# Patient Record
Sex: MALE | Race: WHITE | NOT HISPANIC OR LATINO | Employment: FULL TIME | URBAN - METROPOLITAN AREA
[De-identification: names, ages, dates, MRNs, and addresses within clinical notes are randomized per-mention and may not be internally consistent; named-entity substitution may affect disease eponyms.]

---

## 2023-09-06 PROBLEM — R00.2 PALPITATIONS: Status: ACTIVE | Noted: 2023-09-06

## 2023-09-06 PROBLEM — I25.10 CORONARY ARTERY DISEASE INVOLVING NATIVE CORONARY ARTERY OF NATIVE HEART WITHOUT ANGINA PECTORIS: Status: ACTIVE | Noted: 2023-09-06

## 2023-09-06 PROBLEM — K43.9 VENTRAL HERNIA: Status: ACTIVE | Noted: 2021-02-06

## 2023-09-06 PROBLEM — I25.5 ISCHEMIC CARDIOMYOPATHY: Status: ACTIVE | Noted: 2023-09-06

## 2023-09-06 PROBLEM — I10 ESSENTIAL (PRIMARY) HYPERTENSION: Status: ACTIVE | Noted: 2022-06-20

## 2023-09-06 PROBLEM — H66.92 LEFT OTITIS MEDIA: Status: ACTIVE | Noted: 2022-04-21

## 2023-09-06 PROBLEM — E78.00 HYPERCHOLESTEREMIA: Status: ACTIVE | Noted: 2019-01-09

## 2023-09-06 PROBLEM — I73.00 RAYNAUD'S PHENOMENON WITHOUT GANGRENE: Status: ACTIVE | Noted: 2023-09-06

## 2023-09-06 PROBLEM — M75.41 IMPINGEMENT SYNDROME OF RIGHT SHOULDER: Status: ACTIVE | Noted: 2020-01-24

## 2023-09-06 PROBLEM — E78.00 PURE HYPERCHOLESTEROLEMIA: Status: ACTIVE | Noted: 2020-01-24

## 2023-09-06 PROBLEM — L29.9 PRURITUS: Status: ACTIVE | Noted: 2019-08-05

## 2023-09-06 PROBLEM — I71.9 AORTIC ANEURYSM (CMS-HCC): Status: ACTIVE | Noted: 2022-06-20

## 2023-09-06 PROBLEM — Z95.5 HISTORY OF CORONARY ARTERY STENT PLACEMENT: Status: ACTIVE | Noted: 2023-09-06

## 2023-09-06 PROBLEM — I20.9 ANGINA PECTORIS (CMS-HCC): Status: ACTIVE | Noted: 2023-09-06

## 2023-09-06 PROBLEM — M77.12 LEFT LATERAL EPICONDYLITIS: Status: ACTIVE | Noted: 2020-01-24

## 2023-09-06 RX ORDER — NAPROXEN SODIUM 220 MG/1
81 TABLET, FILM COATED ORAL EVERY OTHER DAY
COMMUNITY

## 2023-09-06 RX ORDER — ACETAMINOPHEN 500 MG
1000 TABLET ORAL EVERY 4 HOURS PRN
COMMUNITY

## 2023-09-06 RX ORDER — LISINOPRIL 5 MG/1
5 TABLET ORAL DAILY
COMMUNITY
End: 2024-01-12 | Stop reason: ALTCHOICE

## 2023-09-06 RX ORDER — ASPIRIN 81 MG/1
81 TABLET ORAL DAILY
COMMUNITY
End: 2024-01-12 | Stop reason: SDUPTHER

## 2023-09-06 RX ORDER — ATORVASTATIN CALCIUM 80 MG/1
80 TABLET, FILM COATED ORAL DAILY
COMMUNITY
End: 2024-01-12 | Stop reason: SDUPTHER

## 2023-09-06 RX ORDER — AMLODIPINE BESYLATE 2.5 MG/1
2.5 TABLET ORAL DAILY
COMMUNITY
End: 2024-01-12 | Stop reason: DRUGHIGH

## 2023-09-06 RX ORDER — ATORVASTATIN CALCIUM 40 MG/1
40 TABLET, FILM COATED ORAL DAILY
COMMUNITY

## 2023-10-30 DIAGNOSIS — I10 PRIMARY HYPERTENSION: Primary | ICD-10-CM

## 2023-10-31 RX ORDER — AMLODIPINE BESYLATE 2.5 MG/1
2.5 TABLET ORAL DAILY
Qty: 30 TABLET | Refills: 11 | Status: SHIPPED | OUTPATIENT
Start: 2023-10-31 | End: 2024-01-12 | Stop reason: WASHOUT

## 2024-01-12 ENCOUNTER — OFFICE VISIT (OUTPATIENT)
Dept: CARDIOLOGY | Facility: CLINIC | Age: 59
End: 2024-01-12
Payer: COMMERCIAL

## 2024-01-12 VITALS
WEIGHT: 184 LBS | SYSTOLIC BLOOD PRESSURE: 160 MMHG | DIASTOLIC BLOOD PRESSURE: 80 MMHG | BODY MASS INDEX: 27.98 KG/M2 | HEART RATE: 67 BPM | OXYGEN SATURATION: 96 %

## 2024-01-12 DIAGNOSIS — I10 ESSENTIAL HYPERTENSION: Primary | ICD-10-CM

## 2024-01-12 PROCEDURE — 3079F DIAST BP 80-89 MM HG: CPT | Performed by: INTERNAL MEDICINE

## 2024-01-12 PROCEDURE — 3077F SYST BP >= 140 MM HG: CPT | Performed by: INTERNAL MEDICINE

## 2024-01-12 PROCEDURE — 99214 OFFICE O/P EST MOD 30 MIN: CPT | Performed by: INTERNAL MEDICINE

## 2024-01-12 PROCEDURE — 1036F TOBACCO NON-USER: CPT | Performed by: INTERNAL MEDICINE

## 2024-01-12 RX ORDER — AMLODIPINE BESYLATE 5 MG/1
5 TABLET ORAL DAILY
Qty: 30 TABLET | Refills: 11 | Status: SHIPPED | OUTPATIENT
Start: 2024-01-12

## 2024-01-12 ASSESSMENT — PAIN SCALES - GENERAL: PAINLEVEL: 0-NO PAIN

## 2024-01-12 NOTE — PROGRESS NOTES
Subjective      No chief complaint on file.       Here to reassess his known coronary artery disease, annual visit, compliant with his high-dose statin and low-dose aspirin therapy.  He has noticed that his blood pressures at home have climbed into the 140s systolic because he has been not eating well with the holidays and he stopped doing his regular exercise routine but he is now committed to healthier eating and daily exercise.    Previous: History of anterior wall myocardial infarction and ventricular fibrillation arrest August 3, 2018 in [facility] with 2 bare-metal stents placed in his proximal and mid LAD at that time.  Initial left ventricular ejection fraction 35 to 40%, and after guideline directed therapy repeat echocardiogram August 2018 left ventricular ejection fraction 50 to 54%.    July 2022 he had successful endovascular graft repair of an abdominal aortic aneurysm by his previous vascular surgeon, Dr. Virgil Valladares.         Review of Systems   All other systems reviewed and are negative.       Objective   Physical Exam  Constitutional:       Appearance: Normal appearance.   HENT:      Head: Normocephalic and atraumatic.   Eyes:      Pupils: Pupils are equal, round, and reactive to light.   Cardiovascular:      Rate and Rhythm: Normal rate and regular rhythm.      Pulses: Normal pulses.      Heart sounds: Normal heart sounds.   Pulmonary:      Effort: Pulmonary effort is normal.      Breath sounds: Normal breath sounds.   Abdominal:      General: Abdomen is flat. Bowel sounds are normal.      Palpations: Abdomen is soft.   Musculoskeletal:         General: Normal range of motion.      Cervical back: Normal range of motion.   Skin:     General: Skin is warm and dry.   Neurological:      General: No focal deficit present.   Psychiatric:         Mood and Affect: Mood normal.         Judgment: Judgment normal.          Lab Review:   Not applicable    Ischemic cardiomyopathy  Continue his chronic aspirin and  atorvastatin therapy, and I reviewed his lipid profile from June 2023 followed by his primary care physician.  Cholesterol 140, triglycerides 60, LDL was 76 and HDL is 72 which is excellent lipid risk reduction.  His metabolic profile showed stable hepatic and renal function.    Increase his amlodipine to 5 mg daily for better blood pressure control until he is successful at committing to resuming his exercise.    Commitment to daily walking/exercise and a low carbohydrate and low sugar [place]/Mediterranean dietary lifestyle.

## 2024-01-12 NOTE — PATIENT INSTRUCTIONS
Ischemic cardiomyopathy  Continue his chronic aspirin and atorvastatin therapy, and I reviewed his lipid profile from June 2023 followed by his primary care physician.  Cholesterol 140, triglycerides 60, LDL was 76 and HDL is 72 which is excellent lipid risk reduction.  His metabolic profile showed stable hepatic and renal function.    Increase his amlodipine to 5 mg daily for better blood pressure control until he is successful at committing to resuming his exercise.    Commitment to daily walking/exercise and a low carbohydrate and low sugar [place]/Mediterranean dietary lifestyle.

## 2024-01-16 RX ORDER — AMLODIPINE BESYLATE 5 MG/1
5 TABLET ORAL DAILY
Qty: 30 TABLET | Refills: 11 | Status: CANCELLED | OUTPATIENT
Start: 2024-01-16

## 2024-01-23 DIAGNOSIS — E78.5 HYPERLIPIDEMIA: Primary | ICD-10-CM

## 2024-01-23 RX ORDER — ATORVASTATIN CALCIUM 40 MG/1
40 TABLET, FILM COATED ORAL DAILY
Qty: 90 TABLET | Refills: 0 | Status: SHIPPED | OUTPATIENT
Start: 2024-01-23 | End: 2024-04-22

## 2024-05-31 ENCOUNTER — OFFICE VISIT (OUTPATIENT)
Dept: CARDIOLOGY | Facility: CLINIC | Age: 59
End: 2024-05-31
Payer: COMMERCIAL

## 2024-05-31 VITALS
SYSTOLIC BLOOD PRESSURE: 146 MMHG | OXYGEN SATURATION: 98 % | HEART RATE: 69 BPM | DIASTOLIC BLOOD PRESSURE: 80 MMHG | WEIGHT: 180 LBS | BODY MASS INDEX: 27.37 KG/M2

## 2024-05-31 DIAGNOSIS — I42.9 CARDIOMYOPATHY (MULTI): Primary | ICD-10-CM

## 2024-05-31 PROCEDURE — 99214 OFFICE O/P EST MOD 30 MIN: CPT | Performed by: INTERNAL MEDICINE

## 2024-05-31 PROCEDURE — 3079F DIAST BP 80-89 MM HG: CPT | Performed by: INTERNAL MEDICINE

## 2024-05-31 PROCEDURE — 3077F SYST BP >= 140 MM HG: CPT | Performed by: INTERNAL MEDICINE

## 2024-05-31 PROCEDURE — 1036F TOBACCO NON-USER: CPT | Performed by: INTERNAL MEDICINE

## 2024-05-31 NOTE — ASSESSMENT & PLAN NOTE
Excellent management on his high-dose atorvastatin that is followed by his primary care physician.

## 2024-05-31 NOTE — PROGRESS NOTES
Subjective      Chief Complaint   Patient presents with    Follow-up        Here to reassess his coronary artery disease and hypertension management, and he remains angina free with normal functional capacity.  He remains on high-dose statin therapy and low-dose aspirin and lipid profiles by his primary care physician.    Previous: History of anterior wall myocardial infarction and ventricular fibrillation arrest August 3, 2018 in [facility] with 2 bare-metal stents placed in his proximal and mid LAD at that time.  Initial left ventricular ejection fraction 35 to 40%, and after guideline directed therapy repeat echocardiogram August 2018 left ventricular ejection fraction 50 to 54%.     July 2022 he had successful endovascular graft repair of an abdominal aortic aneurysm by his previous vascular surgeon, Dr. Virgil Valladares.              Review of Systems   All other systems reviewed and are negative.       Objective   Physical Exam  Constitutional:       Appearance: Normal appearance.   HENT:      Head: Normocephalic and atraumatic.   Eyes:      Pupils: Pupils are equal, round, and reactive to light.   Cardiovascular:      Rate and Rhythm: Normal rate and regular rhythm.      Pulses: Normal pulses.      Heart sounds: Normal heart sounds.   Pulmonary:      Effort: Pulmonary effort is normal.      Breath sounds: Normal breath sounds.   Abdominal:      General: Abdomen is flat. Bowel sounds are normal.      Palpations: Abdomen is soft.   Musculoskeletal:         General: Normal range of motion.      Cervical back: Normal range of motion.   Skin:     General: Skin is warm and dry.   Neurological:      General: No focal deficit present.   Psychiatric:         Mood and Affect: Mood normal.         Judgment: Judgment normal.          Lab Review:   Not applicable    Ischemic cardiomyopathy  History of previous myocardial infarction with left ventricular ejection fraction 50 to 54% on prior echocardiography.    Commitment to daily  walking/exercise and a low carbohydrate and low sugar Mediterranean/[employer] diet discussed.    History of coronary artery stent placement  Continue on his high-dose statin therapy and low-dose aspirin therapy.    Hypercholesteremia  Excellent management on his high-dose atorvastatin that is followed by his primary care physician.

## 2024-05-31 NOTE — ASSESSMENT & PLAN NOTE
History of previous myocardial infarction with left ventricular ejection fraction 50 to 54% on prior echocardiography.  Suggest to repeat echocardiogram in 1 year just prior to his follow-up visit so we can reassess LV systolic function.    Commitment to daily walking/exercise and a low carbohydrate and low sugar Mediterranean/[place] diet discussed.

## 2024-06-06 ENCOUNTER — TELEPHONE (OUTPATIENT)
Dept: CARDIOLOGY | Facility: CLINIC | Age: 59
End: 2024-06-06
Payer: COMMERCIAL

## 2024-06-06 NOTE — TELEPHONE ENCOUNTER
Patient asking if Dr. Cartwright actually wants an echo as he did not discuss at last appointment. Please clarify. Order is in

## 2024-06-19 ENCOUNTER — HOSPITAL ENCOUNTER (OUTPATIENT)
Dept: CARDIOLOGY | Facility: CLINIC | Age: 59
Discharge: HOME | End: 2024-06-19
Payer: COMMERCIAL

## 2024-06-19 ENCOUNTER — TELEPHONE (OUTPATIENT)
Dept: PRIMARY CARE | Facility: CLINIC | Age: 59
End: 2024-06-19
Payer: COMMERCIAL

## 2024-06-19 DIAGNOSIS — Z12.5 SCREENING FOR PROSTATE CANCER: ICD-10-CM

## 2024-06-19 DIAGNOSIS — E78.00 PURE HYPERCHOLESTEROLEMIA: ICD-10-CM

## 2024-06-19 DIAGNOSIS — I42.9 CARDIOMYOPATHY (MULTI): ICD-10-CM

## 2024-06-19 DIAGNOSIS — I10 ESSENTIAL (PRIMARY) HYPERTENSION: ICD-10-CM

## 2024-06-19 DIAGNOSIS — Z00.00 ANNUAL PHYSICAL EXAM: Primary | ICD-10-CM

## 2024-06-19 PROCEDURE — 93306 TTE W/DOPPLER COMPLETE: CPT

## 2024-06-19 PROCEDURE — 93306 TTE W/DOPPLER COMPLETE: CPT | Performed by: INTERNAL MEDICINE

## 2024-06-21 ENCOUNTER — LAB (OUTPATIENT)
Dept: LAB | Facility: LAB | Age: 59
End: 2024-06-21
Payer: COMMERCIAL

## 2024-06-21 DIAGNOSIS — I10 ESSENTIAL (PRIMARY) HYPERTENSION: ICD-10-CM

## 2024-06-21 DIAGNOSIS — Z12.5 SCREENING FOR PROSTATE CANCER: ICD-10-CM

## 2024-06-21 DIAGNOSIS — E78.00 PURE HYPERCHOLESTEROLEMIA: ICD-10-CM

## 2024-06-21 DIAGNOSIS — Z00.00 ANNUAL PHYSICAL EXAM: ICD-10-CM

## 2024-06-21 LAB
ALBUMIN SERPL-MCNC: 4.3 G/DL (ref 3.5–5)
ALP BLD-CCNC: 95 U/L (ref 35–125)
ALT SERPL-CCNC: 17 U/L (ref 5–40)
ANION GAP SERPL CALC-SCNC: 10 MMOL/L
AORTIC VALVE PEAK VELOCITY: 1.6 M/S
APPEARANCE UR: CLEAR
AST SERPL-CCNC: 20 U/L (ref 5–40)
AV PEAK GRADIENT: 10.2 MMHG
AVA (PEAK VEL): 2.55 CM2
BASOPHILS # BLD AUTO: 0.06 X10*3/UL (ref 0–0.1)
BASOPHILS NFR BLD AUTO: 1.2 %
BILIRUB SERPL-MCNC: 0.4 MG/DL (ref 0.1–1.2)
BILIRUB UR STRIP.AUTO-MCNC: NEGATIVE MG/DL
BUN SERPL-MCNC: 15 MG/DL (ref 8–25)
CALCIUM SERPL-MCNC: 9 MG/DL (ref 8.5–10.4)
CHLORIDE SERPL-SCNC: 104 MMOL/L (ref 97–107)
CHOLEST SERPL-MCNC: 144 MG/DL (ref 133–200)
CHOLEST/HDLC SERPL: 2.7 {RATIO}
CO2 SERPL-SCNC: 27 MMOL/L (ref 24–31)
COLOR UR: COLORLESS
CREAT SERPL-MCNC: 1 MG/DL (ref 0.4–1.6)
CREAT UR-MCNC: 56.4 MG/DL
EGFRCR SERPLBLD CKD-EPI 2021: 86 ML/MIN/1.73M*2
EJECTION FRACTION APICAL 4 CHAMBER: 54.5
EJECTION FRACTION: 53 %
EOSINOPHIL # BLD AUTO: 0.1 X10*3/UL (ref 0–0.7)
EOSINOPHIL NFR BLD AUTO: 2 %
ERYTHROCYTE [DISTWIDTH] IN BLOOD BY AUTOMATED COUNT: 13 % (ref 11.5–14.5)
GLUCOSE SERPL-MCNC: 98 MG/DL (ref 65–99)
GLUCOSE UR STRIP.AUTO-MCNC: NORMAL MG/DL
HCT VFR BLD AUTO: 49.4 % (ref 41–52)
HDLC SERPL-MCNC: 53 MG/DL
HGB BLD-MCNC: 16.3 G/DL (ref 13.5–17.5)
IMM GRANULOCYTES # BLD AUTO: 0.02 X10*3/UL (ref 0–0.7)
IMM GRANULOCYTES NFR BLD AUTO: 0.4 % (ref 0–0.9)
KETONES UR STRIP.AUTO-MCNC: NEGATIVE MG/DL
LDLC SERPL CALC-MCNC: 76 MG/DL (ref 65–130)
LEFT ATRIUM VOLUME AREA LENGTH INDEX BSA: 33.3 ML/M2
LEFT VENTRICLE INTERNAL DIMENSION DIASTOLE: 4.7 CM (ref 3.5–6)
LEFT VENTRICULAR OUTFLOW TRACT DIAMETER: 2.1 CM
LEUKOCYTE ESTERASE UR QL STRIP.AUTO: NEGATIVE
LYMPHOCYTES # BLD AUTO: 1.4 X10*3/UL (ref 1.2–4.8)
LYMPHOCYTES NFR BLD AUTO: 27.9 %
MCH RBC QN AUTO: 31.3 PG (ref 26–34)
MCHC RBC AUTO-ENTMCNC: 33 G/DL (ref 32–36)
MCV RBC AUTO: 95 FL (ref 80–100)
MICROALBUMIN UR-MCNC: <12 MG/L (ref 0–23)
MICROALBUMIN/CREAT UR: NORMAL MG/G{CREAT}
MITRAL VALVE E/A RATIO: 0.81
MITRAL VALVE E/E' RATIO: 9.9
MONOCYTES # BLD AUTO: 0.52 X10*3/UL (ref 0.1–1)
MONOCYTES NFR BLD AUTO: 10.4 %
NEUTROPHILS # BLD AUTO: 2.92 X10*3/UL (ref 1.2–7.7)
NEUTROPHILS NFR BLD AUTO: 58.1 %
NITRITE UR QL STRIP.AUTO: NEGATIVE
NRBC BLD-RTO: 0 /100 WBCS (ref 0–0)
PH UR STRIP.AUTO: 6.5 [PH]
PLATELET # BLD AUTO: 227 X10*3/UL (ref 150–450)
POTASSIUM SERPL-SCNC: 4.6 MMOL/L (ref 3.4–5.1)
PROT SERPL-MCNC: 7.2 G/DL (ref 5.9–7.9)
PROT UR STRIP.AUTO-MCNC: NEGATIVE MG/DL
PSA SERPL-MCNC: 1.8 NG/ML
RBC # BLD AUTO: 5.21 X10*6/UL (ref 4.5–5.9)
RBC # UR STRIP.AUTO: NEGATIVE /UL
RIGHT VENTRICLE FREE WALL PEAK S': 11.3 CM/S
RIGHT VENTRICLE PEAK SYSTOLIC PRESSURE: 25.3 MMHG
SODIUM SERPL-SCNC: 141 MMOL/L (ref 133–145)
SP GR UR STRIP.AUTO: 1.01
TRICUSPID ANNULAR PLANE SYSTOLIC EXCURSION: 2.6 CM
TRIGL SERPL-MCNC: 74 MG/DL (ref 40–150)
UROBILINOGEN UR STRIP.AUTO-MCNC: NORMAL MG/DL
WBC # BLD AUTO: 5 X10*3/UL (ref 4.4–11.3)

## 2024-06-21 PROCEDURE — 80053 COMPREHEN METABOLIC PANEL: CPT

## 2024-06-21 PROCEDURE — 80061 LIPID PANEL: CPT

## 2024-06-21 PROCEDURE — 81003 URINALYSIS AUTO W/O SCOPE: CPT

## 2024-06-21 PROCEDURE — 84153 ASSAY OF PSA TOTAL: CPT

## 2024-06-21 PROCEDURE — 36415 COLL VENOUS BLD VENIPUNCTURE: CPT

## 2024-06-21 PROCEDURE — 82570 ASSAY OF URINE CREATININE: CPT

## 2024-06-21 PROCEDURE — 85025 COMPLETE CBC W/AUTO DIFF WBC: CPT

## 2024-06-21 PROCEDURE — 82043 UR ALBUMIN QUANTITATIVE: CPT

## 2024-06-25 ASSESSMENT — PROMIS GLOBAL HEALTH SCALE
RATE_PHYSICAL_HEALTH: EXCELLENT
CARRYOUT_PHYSICAL_ACTIVITIES: COMPLETELY
CARRYOUT_SOCIAL_ACTIVITIES: EXCELLENT
RATE_SOCIAL_SATISFACTION: EXCELLENT
RATE_MENTAL_HEALTH: EXCELLENT
RATE_GENERAL_HEALTH: EXCELLENT
RATE_AVERAGE_PAIN: 2
RATE_QUALITY_OF_LIFE: EXCELLENT
EMOTIONAL_PROBLEMS: NEVER

## 2024-06-27 ENCOUNTER — APPOINTMENT (OUTPATIENT)
Dept: PRIMARY CARE | Facility: CLINIC | Age: 59
End: 2024-06-27
Payer: COMMERCIAL

## 2024-06-27 ENCOUNTER — TELEPHONE (OUTPATIENT)
Dept: PRIMARY CARE | Facility: CLINIC | Age: 59
End: 2024-06-27

## 2024-06-27 VITALS
HEIGHT: 67 IN | RESPIRATION RATE: 18 BRPM | BODY MASS INDEX: 27.94 KG/M2 | OXYGEN SATURATION: 98 % | SYSTOLIC BLOOD PRESSURE: 118 MMHG | DIASTOLIC BLOOD PRESSURE: 84 MMHG | HEART RATE: 62 BPM | WEIGHT: 178 LBS

## 2024-06-27 DIAGNOSIS — I25.10 CORONARY ARTERY DISEASE INVOLVING NATIVE CORONARY ARTERY OF NATIVE HEART WITHOUT ANGINA PECTORIS: ICD-10-CM

## 2024-06-27 DIAGNOSIS — Z72.0 TOBACCO ABUSE: ICD-10-CM

## 2024-06-27 DIAGNOSIS — E78.00 PURE HYPERCHOLESTEROLEMIA: ICD-10-CM

## 2024-06-27 DIAGNOSIS — I10 ESSENTIAL (PRIMARY) HYPERTENSION: ICD-10-CM

## 2024-06-27 DIAGNOSIS — E78.00 PURE HYPERCHOLESTEROLEMIA: Primary | ICD-10-CM

## 2024-06-27 DIAGNOSIS — Z00.00 WELL ADULT EXAM: ICD-10-CM

## 2024-06-27 PROCEDURE — 3074F SYST BP LT 130 MM HG: CPT | Performed by: FAMILY MEDICINE

## 2024-06-27 PROCEDURE — 1036F TOBACCO NON-USER: CPT | Performed by: FAMILY MEDICINE

## 2024-06-27 PROCEDURE — 99396 PREV VISIT EST AGE 40-64: CPT | Performed by: FAMILY MEDICINE

## 2024-06-27 PROCEDURE — 99212 OFFICE O/P EST SF 10 MIN: CPT | Performed by: FAMILY MEDICINE

## 2024-06-27 PROCEDURE — 90471 IMMUNIZATION ADMIN: CPT | Performed by: FAMILY MEDICINE

## 2024-06-27 PROCEDURE — 90715 TDAP VACCINE 7 YRS/> IM: CPT | Performed by: FAMILY MEDICINE

## 2024-06-27 PROCEDURE — 3079F DIAST BP 80-89 MM HG: CPT | Performed by: FAMILY MEDICINE

## 2024-06-27 ASSESSMENT — ENCOUNTER SYMPTOMS
OCCASIONAL FEELINGS OF UNSTEADINESS: 0
LOSS OF SENSATION IN FEET: 0
DEPRESSION: 0

## 2024-06-27 ASSESSMENT — PATIENT HEALTH QUESTIONNAIRE - PHQ9
1. LITTLE INTEREST OR PLEASURE IN DOING THINGS: NOT AT ALL
SUM OF ALL RESPONSES TO PHQ9 QUESTIONS 1 AND 2: 0
2. FEELING DOWN, DEPRESSED OR HOPELESS: NOT AT ALL

## 2024-06-27 ASSESSMENT — PAIN SCALES - GENERAL: PAINLEVEL: 0-NO PAIN

## 2024-06-27 NOTE — PROGRESS NOTES
"Subjective   Patient ID: Willy Rm is a 59 y.o. male who presents for Annual Exam (Patient is here for his annual wellness exam).    HPI   1. GENE is seen for for his comprehensive physical exam. PMH, PSH, family history and social history were reviewed and updated.   Colonoscopy in 3/21 by   showed a tubular adenoma.  He is to repeat the colonoscopy in 2026.     2. GENE is seen today also for follow up of High cholesterol.  He is on atorvastatin 40 mg. Recent LDL 76.      3. GENE is seen today for follow-up CAD.  He is followed by Dr. Cartwright.  He had 2 stents placed in his LAD in 8/18. He is on amlodipine and a baby aspirin daily.    4. He is also here for follow-up of hypertension.  He is on amlodipine 5 mg.      5. He is also here for follow-up of aortic aneurysm.  He is status post stent placement by Dr. Valladares in 2023.  He is followed annually.      6.  he is also here for history of tobacco smoking.    He quit smoking in 2018. He had a low-dose CT scan of his chest in 11/22 which was normal.      7. he is also here for umbilical hernia and left inguinal hernia.  He was seen by surgeon regarding the umbilical hernia in 2021 and no surgery at this time was recommended.  He is noted to have a slight left groin hernia today which is asymptomatic.    Review of Systems  Denies headache, blurred vision, chest pain, shortness of breath, nausea or vomiting, change in bowel habits or leg pain or swelling.    Objective   /84 (BP Location: Left arm, Patient Position: Sitting, BP Cuff Size: Large adult)   Pulse 62   Resp 18   Ht 1.702 m (5' 7\")   Wt 80.7 kg (178 lb)   SpO2 98%   BMI 27.88 kg/m²     Physical Exam  General appearance: Vital signs have been reviewed.  Comfortable.  Well-nourished and well-developed.He is alert and oriented x3 and appears his stated age.The patient is cooperative with exam.  Head: Hair pattern reveals a normal pattern for patient's age and face shows no abnormalities.  Eyes: " PERRLA x2, EOMI x2, conjunctive a and sclera are clear.  Ears: External bilateral ears with normal helix, tragus and earlobe.Bilateral canals are normal.Bilateral tympanic membranes are pearly gray and landmarks are well visualized.  Nose: Nasal mucosa both nostrils reveals no polyps, ulcerations or lesions.  Throat:Teeth are in good repair.  Posterior pharynx reveals no abnormalities.  Neck: Supple without lymphadenopathy, thyromegaly or carotid bruits.  Lungs:Clear to auscultation bilaterally with no wheezes, rales or rhonchi.  Cardiovascular: RRR without MRG.No carotid bruits.  Extremities without edema and pulses are intact.  Abdomen: Soft, NT, no masses, no hepatosplenomegaly.  Genitalia: No testicular masses.  No evidence of inguinal hernia.Nontender.  Rectal: No masses.  Prostate is normal in size and shape with no nodules. It is nontender.  Musculoskeletal: 5/5 and equal strength in bilateral upper and lower extremities.  Skin: Good turgor and without rashes.  Neurological: Good overall strength and no focal deficits.  Cranial nerves II through XII are grossly intact.  Psychiatric: Patient has appropriate judgment with good insight.  Mood is appropriate.    Assessment/Plan   Problem List Items Addressed This Visit             ICD-10-CM       High    Coronary artery disease involving native coronary artery of native heart without angina pectoris I25.10     Continue amlodipine and baby aspirin.  Continue follow-up with Dr. Cartwright.         Essential (primary) hypertension I10     Continue amlodipine.  Continue following with Dr. Spaulding.         Pure hypercholesterolemia - Primary E78.00     Continue atorvastatin.  I will notify results.  Recheck in 6 months.         Well adult exam Z00.00     Anticipatory guidance given. Will give Tdap booster today.         Tobacco abuse Z72.0     Will get low-dose CT scan and notify him of results.         Relevant Orders    CT lung screening low dose

## 2024-06-28 ENCOUNTER — TELEPHONE (OUTPATIENT)
Dept: CARDIOLOGY | Facility: CLINIC | Age: 59
End: 2024-06-28
Payer: COMMERCIAL

## 2024-07-12 ENCOUNTER — HOSPITAL ENCOUNTER (OUTPATIENT)
Dept: RADIOLOGY | Facility: HOSPITAL | Age: 59
Discharge: HOME | End: 2024-07-12
Payer: COMMERCIAL

## 2024-07-12 DIAGNOSIS — Z72.0 TOBACCO ABUSE: ICD-10-CM

## 2024-07-12 PROCEDURE — 71271 CT THORAX LUNG CANCER SCR C-: CPT

## 2024-07-14 DIAGNOSIS — Z72.0 TOBACCO ABUSE: Primary | ICD-10-CM

## 2024-10-19 DIAGNOSIS — I25.10 CORONARY ARTERY DISEASE INVOLVING NATIVE CORONARY ARTERY OF NATIVE HEART WITHOUT ANGINA PECTORIS: Primary | ICD-10-CM

## 2024-10-23 RX ORDER — AMLODIPINE BESYLATE 5 MG/1
5 TABLET ORAL DAILY
Qty: 90 TABLET | Refills: 3 | Status: SHIPPED | OUTPATIENT
Start: 2024-10-23

## 2024-12-23 ENCOUNTER — LAB (OUTPATIENT)
Dept: LAB | Facility: LAB | Age: 59
End: 2024-12-23
Payer: COMMERCIAL

## 2024-12-23 DIAGNOSIS — E78.00 PURE HYPERCHOLESTEROLEMIA: ICD-10-CM

## 2024-12-23 DIAGNOSIS — I10 ESSENTIAL (PRIMARY) HYPERTENSION: ICD-10-CM

## 2024-12-23 LAB
ALBUMIN SERPL BCP-MCNC: 4.4 G/DL (ref 3.4–5)
ALP SERPL-CCNC: 71 U/L (ref 33–136)
ALT SERPL W P-5'-P-CCNC: 18 U/L (ref 10–52)
ANION GAP SERPL CALCULATED.3IONS-SCNC: 8 MMOL/L (ref 10–20)
APPEARANCE UR: CLEAR
AST SERPL W P-5'-P-CCNC: 19 U/L (ref 9–39)
BILIRUB SERPL-MCNC: 0.7 MG/DL (ref 0–1.2)
BILIRUB UR STRIP.AUTO-MCNC: NEGATIVE MG/DL
BUN SERPL-MCNC: 13 MG/DL (ref 6–23)
CALCIUM SERPL-MCNC: 9 MG/DL (ref 8.6–10.3)
CHLORIDE SERPL-SCNC: 106 MMOL/L (ref 98–107)
CHOLEST SERPL-MCNC: 145 MG/DL (ref 0–199)
CHOLEST/HDLC SERPL: 2.8 {RATIO}
CO2 SERPL-SCNC: 32 MMOL/L (ref 21–32)
COLOR UR: NORMAL
CREAT SERPL-MCNC: 1.08 MG/DL (ref 0.5–1.3)
EGFRCR SERPLBLD CKD-EPI 2021: 78 ML/MIN/1.73M*2
GLUCOSE SERPL-MCNC: 103 MG/DL (ref 74–99)
GLUCOSE UR STRIP.AUTO-MCNC: NORMAL MG/DL
HDLC SERPL-MCNC: 52.6 MG/DL
KETONES UR STRIP.AUTO-MCNC: NEGATIVE MG/DL
LDLC SERPL CALC-MCNC: 76 MG/DL
LEUKOCYTE ESTERASE UR QL STRIP.AUTO: NEGATIVE
NITRITE UR QL STRIP.AUTO: NEGATIVE
NON HDL CHOLESTEROL: 92 MG/DL (ref 0–149)
PH UR STRIP.AUTO: 6 [PH]
POTASSIUM SERPL-SCNC: 4.6 MMOL/L (ref 3.5–5.3)
PROT SERPL-MCNC: 7.1 G/DL (ref 6.4–8.2)
PROT UR STRIP.AUTO-MCNC: NEGATIVE MG/DL
RBC # UR STRIP.AUTO: NEGATIVE /UL
SODIUM SERPL-SCNC: 141 MMOL/L (ref 136–145)
SP GR UR STRIP.AUTO: 1.01
TRIGL SERPL-MCNC: 81 MG/DL (ref 0–149)
UROBILINOGEN UR STRIP.AUTO-MCNC: NORMAL MG/DL
VLDL: 16 MG/DL (ref 0–40)

## 2024-12-23 PROCEDURE — 80061 LIPID PANEL: CPT

## 2024-12-23 PROCEDURE — 80053 COMPREHEN METABOLIC PANEL: CPT

## 2024-12-23 PROCEDURE — 81003 URINALYSIS AUTO W/O SCOPE: CPT

## 2024-12-23 PROCEDURE — 36415 COLL VENOUS BLD VENIPUNCTURE: CPT

## 2024-12-30 ENCOUNTER — APPOINTMENT (OUTPATIENT)
Dept: PRIMARY CARE | Facility: CLINIC | Age: 59
End: 2024-12-30
Payer: COMMERCIAL

## 2024-12-30 ENCOUNTER — TELEPHONE (OUTPATIENT)
Dept: PRIMARY CARE | Facility: CLINIC | Age: 59
End: 2024-12-30

## 2024-12-30 VITALS
HEART RATE: 77 BPM | BODY MASS INDEX: 28.98 KG/M2 | OXYGEN SATURATION: 95 % | RESPIRATION RATE: 16 BRPM | DIASTOLIC BLOOD PRESSURE: 88 MMHG | SYSTOLIC BLOOD PRESSURE: 122 MMHG | WEIGHT: 185 LBS

## 2024-12-30 DIAGNOSIS — I10 ESSENTIAL (PRIMARY) HYPERTENSION: ICD-10-CM

## 2024-12-30 DIAGNOSIS — Z12.5 SCREENING FOR PROSTATE CANCER: ICD-10-CM

## 2024-12-30 DIAGNOSIS — E78.00 PURE HYPERCHOLESTEROLEMIA: ICD-10-CM

## 2024-12-30 DIAGNOSIS — Z00.00 WELL ADULT EXAM: Primary | ICD-10-CM

## 2024-12-30 DIAGNOSIS — I25.10 CORONARY ARTERY DISEASE INVOLVING NATIVE CORONARY ARTERY OF NATIVE HEART WITHOUT ANGINA PECTORIS: Primary | ICD-10-CM

## 2024-12-30 DIAGNOSIS — M25.571 CHRONIC PAIN OF BOTH ANKLES: ICD-10-CM

## 2024-12-30 DIAGNOSIS — M25.572 CHRONIC PAIN OF BOTH ANKLES: ICD-10-CM

## 2024-12-30 DIAGNOSIS — G89.29 CHRONIC PAIN OF BOTH ANKLES: ICD-10-CM

## 2024-12-30 PROBLEM — J06.9 ACUTE UPPER RESPIRATORY INFECTION: Status: RESOLVED | Noted: 2020-11-06 | Resolved: 2024-12-30

## 2024-12-30 PROBLEM — Z20.822 CONTACT WITH AND (SUSPECTED) EXPOSURE TO COVID-19: Status: ACTIVE | Noted: 2022-08-10

## 2024-12-30 PROBLEM — I46.9 CARDIAC ARREST WITH SUCCESSFUL RESUSCITATION: Status: ACTIVE | Noted: 2024-12-30

## 2024-12-30 PROCEDURE — 1036F TOBACCO NON-USER: CPT | Performed by: FAMILY MEDICINE

## 2024-12-30 PROCEDURE — 3079F DIAST BP 80-89 MM HG: CPT | Performed by: FAMILY MEDICINE

## 2024-12-30 PROCEDURE — 99214 OFFICE O/P EST MOD 30 MIN: CPT | Performed by: FAMILY MEDICINE

## 2024-12-30 PROCEDURE — 3074F SYST BP LT 130 MM HG: CPT | Performed by: FAMILY MEDICINE

## 2024-12-30 ASSESSMENT — PAIN SCALES - GENERAL: PAINLEVEL_OUTOF10: 0-NO PAIN

## 2024-12-30 ASSESSMENT — PATIENT HEALTH QUESTIONNAIRE - PHQ9
2. FEELING DOWN, DEPRESSED OR HOPELESS: NOT AT ALL
1. LITTLE INTEREST OR PLEASURE IN DOING THINGS: NOT AT ALL
SUM OF ALL RESPONSES TO PHQ9 QUESTIONS 1 AND 2: 0

## 2024-12-30 NOTE — ASSESSMENT & PLAN NOTE
I suspect mild DJD or perhaps soft tissue strain.  Recommended trial of glucosamine.  At this point since his exam is normal and his symptoms resolved after only a few minutes we will do no imaging.  If his symptoms change she is to let me know.

## 2024-12-30 NOTE — PROGRESS NOTES
Subjective   Patient ID: Willy Rm is a 59 y.o. male who presents for Hypertension (Patient is here for a HTN check ), Hyperlipidemia (Patient is here for a chol check patient refused the flu shot today ), and Coronary Artery Disease (Patient is here for CAD check ).    HPI   1. GENE is seen today  for follow up of High cholesterol.  He is on atorvastatin 40 mg. Recent LDL 76.      2. GENE is seen today for follow-up CAD.  He is followed by Dr. Cartwright.  He had 2 stents placed in his LAD in 8/18. He is on amlodipine and a baby aspirin daily.     3. He is also here for follow-up of hypertension.  He is on amlodipine 5 mg.      4. He is also here for follow-up of aortic aneurysm.  He is status post stent placement by Dr. Valladares in 2023.  He is followed annually by [facility] (Dr. Finch).      5.  he is also here for history of tobacco smoking.    He quit smoking in 2018. He had a low-dose CT scan of his chest in 11/22 which was normal.She is scheduled for repeat low-dose CT scan next week.    6. He is here for soreness in his ankles.  He states for the past several months he has stiffness in his ankles when he wakes up.  For the first few minutes while walking around it is very sore and then resolves completely for the rest today.  He does a lot of walking and exercising and it does not bother him at all throughout the day.He notices no redness or swelling in the morning.    Review of Systems  Denies headache, blurred vision, chest pain, shortness of breath, nausea or vomiting, change in bowel habits or leg pain or swelling.    Objective   /88 (BP Location: Left arm, Patient Position: Sitting, BP Cuff Size: Large adult)   Pulse 77   Resp 16   Wt 83.9 kg (185 lb)   SpO2 95%   BMI 28.98 kg/m²     Physical Exam  Vitals and nurse's notes reviewed  General: no acute distress  HEENT: Normal  Neck: Supple  Lungs: Clear  Cardio: RRR w/o murmur  Abdomen: Soft, nontender, no hepatosplenomegaly  Extremities: No edema, no  calf tenderness.Left ankle with no effusion no redness.  Full range of motion.  Good stability.  Neuro: Alert and oriented with no focal deficits. Normal gait    Assessment/Plan   Problem List Items Addressed This Visit             ICD-10-CM       High    Coronary artery disease involving native coronary artery of native heart without angina pectoris - Primary I25.10     Continue current medication following with Dr. Cartwright.         Essential (primary) hypertension I10     Continue amlodipine.  Continue cutting back on salt in diet.  Continue exercising.  Recheck in 6 months at [facility].         Pure hypercholesterolemia E78.00       Medium    Bilateral ankle pain M25.571, M25.572     I suspect mild DJD or perhaps soft tissue strain.  Recommended trial of glucosamine.  At this point since his exam is normal and his symptoms resolved after only a few minutes we will do no imaging.  If his symptoms change she is to let me know.

## 2024-12-30 NOTE — ASSESSMENT & PLAN NOTE
Continue amlodipine.  Continue cutting back on salt in diet.  Continue exercising.  Recheck in 6 months at [facility].

## 2025-01-02 ENCOUNTER — HOSPITAL ENCOUNTER (OUTPATIENT)
Dept: RADIOLOGY | Facility: HOSPITAL | Age: 60
Discharge: HOME | End: 2025-01-02
Payer: COMMERCIAL

## 2025-01-02 DIAGNOSIS — R91.8 OTHER NONSPECIFIC ABNORMAL FINDING OF LUNG FIELD: ICD-10-CM

## 2025-01-02 PROCEDURE — 71250 CT THORAX DX C-: CPT

## 2025-01-02 PROCEDURE — 76380 CAT SCAN FOLLOW-UP STUDY: CPT | Performed by: RADIOLOGY

## 2025-01-22 ENCOUNTER — TELEPHONE (OUTPATIENT)
Dept: PRIMARY CARE | Facility: CLINIC | Age: 60
End: 2025-01-22

## 2025-01-22 ENCOUNTER — OFFICE VISIT (OUTPATIENT)
Dept: PRIMARY CARE | Facility: CLINIC | Age: 60
End: 2025-01-22
Payer: COMMERCIAL

## 2025-01-22 VITALS
OXYGEN SATURATION: 97 % | HEART RATE: 73 BPM | SYSTOLIC BLOOD PRESSURE: 156 MMHG | TEMPERATURE: 98 F | BODY MASS INDEX: 29.13 KG/M2 | WEIGHT: 186 LBS | DIASTOLIC BLOOD PRESSURE: 84 MMHG

## 2025-01-22 DIAGNOSIS — M79.645 PAIN OF LEFT MIDDLE FINGER: Primary | ICD-10-CM

## 2025-01-22 PROCEDURE — 99213 OFFICE O/P EST LOW 20 MIN: CPT

## 2025-01-22 PROCEDURE — 3077F SYST BP >= 140 MM HG: CPT

## 2025-01-22 PROCEDURE — 3079F DIAST BP 80-89 MM HG: CPT

## 2025-01-22 RX ORDER — PREDNISONE 20 MG/1
40 TABLET ORAL DAILY
Qty: 10 TABLET | Refills: 0 | Status: SHIPPED | OUTPATIENT
Start: 2025-01-22 | End: 2025-01-27

## 2025-01-22 ASSESSMENT — PAIN SCALES - GENERAL: PAINLEVEL_OUTOF10: 3

## 2025-01-22 NOTE — PROGRESS NOTES
Subjective   Patient ID: Willy Rm is a 59 y.o. male who presents for Cyst (Left hand x 1 month/Has gotten bigger, red, hurts to touch).    HPI   Willy is seen for c/o left middle finger pain for about a month. Started to worsen about 3-4 days ago. No injury to the area. Tender to the touch, warm, occasional tingling, swelling. Worse in morning. Has not taken anything for the pain at home. Has never had gout before. Pain 2-3/10. Right handed. Denies drainage, mass, decreased ROM, fevers, chills.     Review of Systems  All other systems have been reviewed and are negative except as noted in the HPI.     Objective   /84   Pulse 73   Temp 36.7 °C (98 °F)   Wt 84.4 kg (186 lb)   SpO2 97%   BMI 29.13 kg/m²     Physical Exam    Assessment/Plan   {Assess/PlanSmartLinks:20261}

## 2025-01-22 NOTE — TELEPHONE ENCOUNTER
Patient called and stated he wanted to schedule a F/U visit for Cyst on left hand. Patient stated it's warm to the touch, red and painful. He wanted to schedule with JAT so an appointment was scheduled for 01-23-25 with JAT due to JAT being out of the office today, 01-22-25. Patient is aware that JAT is out of the office today. Should Patient keep the appointment for tomorrow or be seen today with another Provider? Call was Triaged to the Covering Provider [email or web address] she advised if patient is experiencing increase in pain, redness, tenderness he should be evaluated today to assess for infection. Patient was told what TRP advised and rescheduled for today, 01-22-25.

## 2025-01-23 ENCOUNTER — APPOINTMENT (OUTPATIENT)
Dept: PRIMARY CARE | Facility: CLINIC | Age: 60
End: 2025-01-23
Payer: COMMERCIAL

## 2025-01-29 DIAGNOSIS — E78.00 PURE HYPERCHOLESTEROLEMIA: ICD-10-CM

## 2025-01-30 ENCOUNTER — OFFICE VISIT (OUTPATIENT)
Dept: PRIMARY CARE | Facility: CLINIC | Age: 60
End: 2025-01-30
Payer: COMMERCIAL

## 2025-01-30 VITALS
SYSTOLIC BLOOD PRESSURE: 156 MMHG | TEMPERATURE: 98 F | HEART RATE: 66 BPM | DIASTOLIC BLOOD PRESSURE: 88 MMHG | WEIGHT: 186 LBS | BODY MASS INDEX: 29.13 KG/M2 | OXYGEN SATURATION: 96 %

## 2025-01-30 DIAGNOSIS — M79.645 PAIN OF LEFT MIDDLE FINGER: Primary | ICD-10-CM

## 2025-01-30 PROCEDURE — 99213 OFFICE O/P EST LOW 20 MIN: CPT

## 2025-01-30 PROCEDURE — 3079F DIAST BP 80-89 MM HG: CPT

## 2025-01-30 PROCEDURE — 1036F TOBACCO NON-USER: CPT

## 2025-01-30 PROCEDURE — 3077F SYST BP >= 140 MM HG: CPT

## 2025-01-30 RX ORDER — ATORVASTATIN CALCIUM 40 MG/1
40 TABLET, FILM COATED ORAL DAILY
Qty: 90 TABLET | Refills: 3 | Status: SHIPPED | OUTPATIENT
Start: 2025-01-30

## 2025-01-30 ASSESSMENT — PAIN SCALES - GENERAL: PAINLEVEL_OUTOF10: 0-NO PAIN

## 2025-01-30 NOTE — PROGRESS NOTES
Subjective   Patient ID: Willy Rm is a 59 y.o. male who presents for Follow-up (Left middle finger, still swollen).    HPI   Gene is seen for follow up left middle finger pain. Intermittent discomfort and swelling. Unchanged but does note another bump on finger. Prednisone did not help much. Has also been using Tylenol and ice with mild relief. No known injury. Denies fevers, chills, numbness, tingling, limited ROM.     Review of Systems  All other systems have been reviewed and are negative except as noted in the HPI.     Objective   /88   Pulse 66   Temp 36.7 °C (98 °F)   Wt 84.4 kg (186 lb)   SpO2 96%   BMI 29.13 kg/m²     Physical Exam  Vitals and nursing note reviewed.   Constitutional:       General: He is not in acute distress.  Eyes:      Extraocular Movements: Extraocular movements intact.      Conjunctiva/sclera: Conjunctivae normal.   Cardiovascular:      Rate and Rhythm: Normal rate.   Pulmonary:      Effort: Pulmonary effort is normal.   Musculoskeletal:      Cervical back: Neck supple.      Comments: Mild swelling of left middle finger. Mild TTP medial PIP, pea-sized, mobile mass at base of finger (lateral) with mild TTP. No fluctuance. Cap refill <2 seconds. Sensation intact. ROM intact.    Skin:     General: Skin is warm.      Capillary Refill: Capillary refill takes less than 2 seconds.   Neurological:      General: No focal deficit present.      Mental Status: He is alert.   Psychiatric:         Mood and Affect: Mood normal.         Assessment/Plan   Assessment & Plan  Pain of left middle finger  Acute.   Referral to orthopedics for further evaluation and management. Discussed possible etiologies including tendon sheath cyst, ganglion cyst, bone spur. Low suspicion for acute infection.   Continue OTC Tylenol/NSAIDs, ice, rest as directed.   Follow up if symptoms worsen.     Orders:    Referral to Orthopaedic Surgery; Future

## 2025-03-06 ENCOUNTER — HOSPITAL ENCOUNTER (OUTPATIENT)
Dept: RADIOLOGY | Facility: HOSPITAL | Age: 60
Discharge: HOME | End: 2025-03-06
Payer: COMMERCIAL

## 2025-03-06 DIAGNOSIS — R22.32 LOCALIZED SWELLING, MASS AND LUMP, LEFT UPPER LIMB: ICD-10-CM

## 2025-03-06 PROCEDURE — 73218 MRI UPPER EXTREMITY W/O DYE: CPT | Mod: LT

## 2025-06-10 ENCOUNTER — APPOINTMENT (OUTPATIENT)
Dept: CARDIOLOGY | Facility: CLINIC | Age: 60
End: 2025-06-10
Payer: COMMERCIAL

## 2025-07-24 LAB — PSA SERPL-MCNC: 1.46 NG/ML

## 2025-07-25 LAB
ALBUMIN SERPL-MCNC: 4.3 G/DL (ref 3.6–5.1)
ALBUMIN/CREAT UR: NORMAL MG/G CREAT
ALP SERPL-CCNC: 73 U/L (ref 35–144)
ALT SERPL-CCNC: 15 U/L (ref 9–46)
ANION GAP SERPL CALCULATED.4IONS-SCNC: 7 MMOL/L (CALC) (ref 7–17)
APPEARANCE UR: CLEAR
AST SERPL-CCNC: 17 U/L (ref 10–35)
BASOPHILS # BLD AUTO: 48 CELLS/UL (ref 0–200)
BASOPHILS NFR BLD AUTO: 1 %
BILIRUB SERPL-MCNC: 0.6 MG/DL (ref 0.2–1.2)
BILIRUB UR QL STRIP: NEGATIVE
BUN SERPL-MCNC: 11 MG/DL (ref 7–25)
CALCIUM SERPL-MCNC: 8.8 MG/DL (ref 8.6–10.3)
CHLORIDE SERPL-SCNC: 106 MMOL/L (ref 98–110)
CHOLEST SERPL-MCNC: 134 MG/DL
CHOLEST/HDLC SERPL: 2.7 (CALC)
CO2 SERPL-SCNC: 28 MMOL/L (ref 20–32)
COLOR UR: YELLOW
CREAT SERPL-MCNC: 0.94 MG/DL (ref 0.7–1.35)
CREAT UR-MCNC: 88 MG/DL (ref 20–320)
EGFRCR SERPLBLD CKD-EPI 2021: 92 ML/MIN/1.73M2
EOSINOPHIL # BLD AUTO: 110 CELLS/UL (ref 15–500)
EOSINOPHIL NFR BLD AUTO: 2.3 %
ERYTHROCYTE [DISTWIDTH] IN BLOOD BY AUTOMATED COUNT: 13.5 % (ref 11–15)
GLUCOSE SERPL-MCNC: 101 MG/DL (ref 65–99)
GLUCOSE UR QL STRIP: NEGATIVE
HCT VFR BLD AUTO: 47.9 % (ref 38.5–50)
HDLC SERPL-MCNC: 50 MG/DL
HGB BLD-MCNC: 15.8 G/DL (ref 13.2–17.1)
HGB UR QL STRIP: NEGATIVE
KETONES UR QL STRIP: NEGATIVE
LDLC SERPL CALC-MCNC: 68 MG/DL (CALC)
LEUKOCYTE ESTERASE UR QL STRIP: NEGATIVE
LYMPHOCYTES # BLD AUTO: 1104 CELLS/UL (ref 850–3900)
LYMPHOCYTES NFR BLD AUTO: 23 %
MCH RBC QN AUTO: 31.5 PG (ref 27–33)
MCHC RBC AUTO-ENTMCNC: 33 G/DL (ref 32–36)
MCV RBC AUTO: 95.4 FL (ref 80–100)
MICROALBUMIN UR-MCNC: <0.2 MG/DL
MONOCYTES # BLD AUTO: 461 CELLS/UL (ref 200–950)
MONOCYTES NFR BLD AUTO: 9.6 %
NEUTROPHILS # BLD AUTO: 3077 CELLS/UL (ref 1500–7800)
NEUTROPHILS NFR BLD AUTO: 64.1 %
NITRITE UR QL STRIP: NEGATIVE
NONHDLC SERPL-MCNC: 84 MG/DL (CALC)
PH UR STRIP: 7 [PH] (ref 5–8)
PLATELET # BLD AUTO: 200 THOUSAND/UL (ref 140–400)
PMV BLD REES-ECKER: 8.9 FL (ref 7.5–12.5)
POTASSIUM SERPL-SCNC: 4 MMOL/L (ref 3.5–5.3)
PROT SERPL-MCNC: 6.6 G/DL (ref 6.1–8.1)
PROT UR QL STRIP: NEGATIVE
RBC # BLD AUTO: 5.02 MILLION/UL (ref 4.2–5.8)
SODIUM SERPL-SCNC: 141 MMOL/L (ref 135–146)
SP GR UR STRIP: 1.01 (ref 1–1.03)
TRIGL SERPL-MCNC: 79 MG/DL
WBC # BLD AUTO: 4.8 THOUSAND/UL (ref 3.8–10.8)

## 2025-07-31 ENCOUNTER — OFFICE VISIT (OUTPATIENT)
Facility: CLINIC | Age: 60
End: 2025-07-31
Payer: COMMERCIAL

## 2025-07-31 VITALS
HEART RATE: 66 BPM | WEIGHT: 186.5 LBS | SYSTOLIC BLOOD PRESSURE: 118 MMHG | OXYGEN SATURATION: 97 % | BODY MASS INDEX: 29.21 KG/M2 | DIASTOLIC BLOOD PRESSURE: 70 MMHG

## 2025-07-31 DIAGNOSIS — E78.00 HYPERCHOLESTEREMIA: Primary | ICD-10-CM

## 2025-07-31 DIAGNOSIS — I71.20 THORACIC AORTIC ANEURYSM WITHOUT RUPTURE, UNSPECIFIED PART: ICD-10-CM

## 2025-07-31 DIAGNOSIS — I10 ESSENTIAL (PRIMARY) HYPERTENSION: ICD-10-CM

## 2025-07-31 DIAGNOSIS — Z95.5 HISTORY OF CORONARY ARTERY STENT PLACEMENT: ICD-10-CM

## 2025-07-31 PROCEDURE — 3074F SYST BP LT 130 MM HG: CPT | Performed by: INTERNAL MEDICINE

## 2025-07-31 PROCEDURE — 99214 OFFICE O/P EST MOD 30 MIN: CPT | Performed by: INTERNAL MEDICINE

## 2025-07-31 PROCEDURE — 99211 OFF/OP EST MAY X REQ PHY/QHP: CPT

## 2025-07-31 PROCEDURE — 3078F DIAST BP <80 MM HG: CPT | Performed by: INTERNAL MEDICINE

## 2025-07-31 ASSESSMENT — PATIENT HEALTH QUESTIONNAIRE - PHQ9
1. LITTLE INTEREST OR PLEASURE IN DOING THINGS: NOT AT ALL
2. FEELING DOWN, DEPRESSED OR HOPELESS: NOT AT ALL
SUM OF ALL RESPONSES TO PHQ9 QUESTIONS 1 AND 2: 0

## 2025-07-31 ASSESSMENT — PAIN SCALES - GENERAL: PAINLEVEL_OUTOF10: 0-NO PAIN

## 2025-07-31 ASSESSMENT — ENCOUNTER SYMPTOMS
DEPRESSION: 0
OCCASIONAL FEELINGS OF UNSTEADINESS: 0
LOSS OF SENSATION IN FEET: 0

## 2025-07-31 NOTE — PROGRESS NOTES
[facility] [place]        Subjective   No chief complaint on file.     Here to reassess his coronary artery disease and hypertension management, stable functional capacity and he remains angina free .He remains on high-dose statin therapy and low-dose aspirin and lipid profiles by his primary care physician.     Previous: History of anterior wall myocardial infarction and ventricular fibrillation arrest August 3, 2018 in [facility] with 2 bare-metal stents placed in his proximal and mid LAD at that time.  Initial left ventricular ejection fraction 35 to 40%, and after guideline directed therapy repeat echocardiogram August 2018 left ventricular ejection fraction 50 to 54%.     July 2022 he had successful endovascular graft repair of an abdominal aortic aneurysm by his previous vascular surgeon, Dr. Virgil Valladares.             He has a past medical history of Acute upper respiratory infection (11/06/2020), Angina pectoris, CAD (coronary artery disease), Hyperlipidemia, Hypertension, and Myocardial infarction (Multi).  He has a past surgical history that includes CT angio abdomen pelvis w and or wo IV IV contrast (05/09/2022); CT angio abdomen pelvis w and or wo IV IV contrast (09/20/2022); Cardiac catheterization; and Coronary stent placement.   No relevant family history has been documented for this patient.  Current Outpatient Medications   Medication Sig Dispense Refill    acetaminophen (Tylenol) 500 mg tablet Take 2 tablets (1,000 mg) by mouth every 4 hours if needed.      amLODIPine (Norvasc) 5 mg tablet TAKE ONE TABLET BY MOUTH ONCE DAILY 90 tablet 3    aspirin 81 mg chewable tablet Chew 1 tablet (81 mg) every other day.      atorvastatin (Lipitor) 40 mg tablet TAKE 1 TABLET BY MOUTH EVERY DAY FOR 90 DAYS 90 tablet 0    atorvastatin (Lipitor) 40 mg tablet TAKE 1 TABLET BY MOUTH EVERY DAY 90 tablet 3     No current facility-administered medications for this visit.       reports that he quit smoking about 8 years ago. His smoking use included cigarettes. He started smoking about 49 years ago. He has a 41 pack-year smoking history. He has been exposed to tobacco smoke. He has never used smokeless tobacco. He reports that he does not currently use alcohol. He reports that he does not currently use drugs.  Allergies:  Patient has no known allergies.    ROS: See HPI  CONSTITUTIONAL: Chills- none. Fever- none. Weight change appropriate for age.  HEENT: Headache- Negative.  Change in vision- none.  Ear pain- none. Nasal congestion- none. Post-nasal drip-none.  Sore throat-none.  CARDIOLOGY: Chest pain- none.  Leg edema-trace.   Palpitation- none.  RESPIRATORY: Denies any shortness of breath.  GI: Abdominal pain- none.  Change in bowel habits- none.  Constipation- none.  Diarrhea- none.  Nausea- none.  Vomiting- none.  MUSCULOSKELETAL: Joint pain- none.  Muscle aches- none.  DERMATOLOGY: Rash- none.  NEUROLOGY: Dizziness- none.   Headache- none.  PSYCHIATRY: Denies any depression or anxiety     There were no vitals filed for this visit.   BMI:There is no height or weight on file to calculate BMI.   General Cardiology:  General Appearance: Alert, oriented and in no acute distress.  HEENT: extra ocular movements intact (EOMI), pupils equal,  round, reactive to light and accommodation (PERRLA).  Carotid Upstroke: no bruit, normal.  Jugular Venous Distention (JVD): flat.  Chest: normal.  Lungs: Clear to auscultation,   Heart Sounds: Normal S1-S2 with a regular rhythm, no murmurs gallops or rubs  Abdomen: no hepatomegaly, no masses felt, soft.  Extremities: no leg edema.  Peripheral pulses: 2 plus bilateral.  NEUROLOGY Cranial nerves II-XII grossly intact.     Last Labs:  CMP:  Recent Labs     07/24/25  0704 12/23/24  0727 06/21/24  0811    141 141   K 4.0 4.6 4.6    106 104   CO2 28 32 27   ANIONGAP 7 8* 10   BUN 11 13 15   CREATININE 0.94 1.08 1.00   EGFR 92 78 86   GLUCOSE 101*  "103* 98     Recent Labs     07/24/25  0704 12/23/24  0727 06/21/24  0811   ALBUMIN 4.3 4.4 4.3   ALKPHOS 73 71 95   ALT 15 18 17   AST 17 19 20   BILITOT 0.6 0.7 0.4     CBC:  Recent Labs     07/24/25  0704 06/21/24  0811 06/19/23  0839   WBC 4.8 5.0 4.5   HGB 15.8 16.3 16.0   HCT 47.9 49.4 47.1    227 193   MCV 95.4 95 95.3     COAG:   Recent Labs     08/29/18  1121   INR 1.0     HEME/ENDO:  Recent Labs     06/19/23  0839 06/14/22  0731 02/01/21  0715 01/18/20  0803   TSH  --  3.60 3.38 3.47   HGBA1C 5.4  --   --   --       CARDIAC: No results for input(s): \"LDH\", \"CKMB\", \"TROPHS\", \"BNP\" in the last 70194 hours.    No lab exists for component: \"CK\", \"CKMBP\"  Recent Labs     07/24/25  0704 12/23/24  0727 06/21/24  0811   CHOL 134 145 144   LDLCALC 68 76 76   HDL 50 52.6 53.0   TRIG 79 81 74       Last Cardiology Tests:  Echo:  Echo Results:  Transthoracic Echo (TTE) Complete 06/19/2024    [facility], [address] [place] [address]  Tel [phone] and Fax [phone]    TRANSTHORACIC ECHOCARDIOGRAM REPORT      Patient Name:      DARIUS Givens Physician:    [id number] Santiago Combs MD  Study Date:        6/19/2024            Ordering Provider:    [id number] PRANAY SENIOR  MRN/PID:           [id number]             Fellow:  Accession#:        [id number]         Nurse:  Date of Birth/Age: 1965 / 59 years Sonographer:          Koki Hamilton RDCS  Gender:            M                    Additional Staff:  Height:            170.18 cm            Admit Date:  Weight:            83.92 kg             Admission Status:     Outpatient  BSA / BMI:         1.96 m2 / 28.98      Encounter#:           [id number]  kg/m2  Blood Pressure:    146/80 mmHg          Department Location:  [facility]    Study Type:    TRANSTHORACIC ECHO (TTE) COMPLETE  Diagnosis/ICD: Cardiomyopathy, unspecified-I42.9  Indication:    Cardiomyopathy  CPT Code:      Echo Complete w Full " [id number]    Patient History:  Pertinent History: CAD, HTN, Chest Pain, Cardiomyopathy and Palpitations.    Study Detail: The following Echo studies were performed: 2D, M-Mode, Doppler and  color flow. Technically challenging study due to prominent lung  artifact.      PHYSICIAN INTERPRETATION:  Left Ventricle: The left ventricular systolic function is low normal, with a visually estimated ejection fraction of 50-55%. There are no regional wall motion abnormalities. The left ventricular cavity size is normal. Spectral Doppler shows an impaired relaxation pattern of left ventricular diastolic filling.  Left Atrium: The left atrium is normal in size.  Right Ventricle: The right ventricle is normal in size. There is normal right ventriclar wall thickness. There is normal right ventricular global systolic function.  Right Atrium: The right atrium is normal in size.  Aortic Valve: The aortic valve is trileaflet. The aortic valve appears tricuspid and non-restricted. There is minimal aortic valve cusp calcification. There is no evidence of reduced aortic valve leaflet excursion excursion. There is evidence of mildly elevated transaortic gradients consistent with sclerosis of the aortic valve. There is trivial aortic valve regurgitation. The peak instantaneous gradient of the aortic valve is 10.2 mmHg.  Mitral Valve: The mitral valve is normal in structure. There is normal mitral valve leaflet mobility. There is mild mitral valve regurgitation.  Tricuspid Valve: The tricuspid valve is structurally normal. There is normal tricuspid valve leaflet mobility. There is mild tricuspid regurgitation.  Pulmonic Valve: The pulmonic valve is structurally normal. There is trace pulmonic valve regurgitation.  Pericardium: There is no pericardial effusion noted.  Aorta: The aortic root is normal. The Ao Sinus is 3.60 cm. The Asc Ao is 3.10 cm. There is no dilatation of the ascending aorta. The aortic root is at the upper limits of  normal size. There is plaque visualized in the ascending aorta, which is classified as a Grade 2 [mild (focal or diffuse) intimal thickening of 2-3 mm] atherosclerosis.  Pulmonary Artery: The pulmonary artery is normal in size. The tricuspid regurgitant velocity is 2.36 m/s, and with an estimated right atrial pressure of 3 mmHg, the estimated pulmonary artery pressure is normal with the RVSP at 25.3 mmHg. The estimated PASP is 25 mmHg.  Systemic Veins: The inferior vena cava appears to be of normal size.      CONCLUSIONS:  1. The left ventricular systolic function is low normal, with a visually estimated ejection fraction of 50-55%.  2. Spectral Doppler shows an impaired relaxation pattern of left ventricular diastolic filling.  3. Mild mitral valve regurgitation.  4. Mild tricuspid regurgitation visualized.  5. Aortic valve sclerosis.  6. There is plaque visualized in the ascending aorta.    QUANTITATIVE DATA SUMMARY:  2D MEASUREMENTS:  Normal Ranges:  Ao Root d:     3.60 cm   (2.0-3.7cm)  LAs:           3.00 cm   (2.7-4.0cm)  IVSd:          1.00 cm   (0.6-1.1cm)  LVPWd:         0.90 cm   (0.6-1.1cm)  LVIDd:         4.70 cm   (3.9-5.9cm)  LVIDs:         3.50 cm  LV Mass Index: 78.4 g/m2  LV % FS        25.5 %    LA VOLUME:  Normal Ranges:  LA Vol A4C:        65.8 ml    (22+/-6mL/m2)  LA Vol A2C:        62.4 ml  LA Vol BP:         65.2 ml  LA Vol Index A4C:  33.6 ml/m2  LA Vol Index A2C:  31.9 ml/m2  LA Vol Index BP:   33.3 ml/m2  LA Area A4C:       21.1 cm2  LA Area A2C:       20.2 cm2  LA Major Axis A4C: 5.8 cm  LA Major Axis A2C: 5.6 cm  LA Volume Index:   31.6 ml/m2  LA Vol A4C:        63.0 ml  LA Vol A2C:        60.0 ml    M-MODE MEASUREMENTS:  Normal Ranges:  Ao Root: 3.50 cm (2.0-3.7cm)    AORTA MEASUREMENTS:  Normal Ranges:  Ao Sinus, d: 3.60 cm (2.1-3.5cm)  Asc Ao, d:   3.10 cm (2.1-3.4cm)    LV SYSTOLIC FUNCTION BY 2D PLANIMETRY (MOD):  Normal Ranges:  EF-A4C View:    54 % (>=55%)  EF-A2C View:    51  %  EF-Biplane:     54 %  EF-Visual:      53 %  LV EF Reported: 53 %    LV DIASTOLIC FUNCTION:  Normal Ranges:  MV Peak E:        0.80 m/s    (0.7-1.2 m/s)  MV Peak A:        0.98 m/s    (0.42-0.7 m/s)  E/A Ratio:        0.81        (1.0-2.2)  MV e'             0.08        (>8.0)  MV lateral e'     0.08 m/s  MV medial e'      0.07 m/s  MV A Dur:         135.00 msec  E/e' Ratio:       10.59       (<8.0)  PulmV Sys Jose:    42.90 cm/s  PulmV Aguilar Jose:   34.10 cm/s  PulmV S/D Jose:    1.30  PulmV A Revs Jose: 22.20 cm/s  PulmV A Revs Dur: 77.00 msec    MITRAL VALVE:  Normal Ranges:  MV Vmax:    0.99 m/s (<=1.3m/s)  MV peak PG: 3.9 mmHg (<5mmHg)  MV mean P.0 mmHg (<2mmHg)  MV DT:      240 msec (150-240msec)    AORTIC VALVE:  Normal Ranges:  AoV Vmax:      1.60 m/s  (<=1.7m/s)  AoV Peak PG:   10.2 mmHg (<20mmHg)  LVOT Max Jose:  1.18 m/s  (<=1.1m/s)  LVOT VTI:      23.80 cm  LVOT Diameter: 2.10 cm   (1.8-2.4cm)  AoV Area,Vmax: 2.55 cm2  (2.5-4.5cm2)      RIGHT VENTRICLE:  RV Basal 4.03 cm  RV Mid   2.57 cm  RV Major 7.2 cm  TAPSE:   25.7 mm  RV s'    0.11 m/s    TRICUSPID VALVE/RVSP:  Normal Ranges:  Peak TR Velocity: 2.36 m/s  Est. RA Pressure: 3 mmHg  RV Syst Pressure: 25.3 mmHg (< 30mmHg)  IVC Diam:         1.39 cm    PULMONIC VALVE:  Normal Ranges:  PV Max Jose: 0.9 m/s  (0.6-0.9m/s)  PV Max PG:  3.0 mmHg    Pulmonary Veins:  PulmV A Revs Dur: 77.00 msec  PulmV A Revs Jose: 22.20 cm/s  PulmV Aguilar Jose:   34.10 cm/s  PulmV S/D Jose:    1.30  PulmV Sys Jose:    42.90 cm/s      [id number] Santiago Combs MD  Electronically signed on 2024 at 2:57:33 PM        ** Final **     Cath:  Stress Test:  Stress Results:  No results found for this or any previous visit from the past 365 days.     Cardiac Imaging:    Problem List Items Addressed This Visit       History of coronary artery stent placement    Continue chronic aspirin therapy and high-dose statin therapy, commitment to daily walking/exercise and low carbohydrate and low sugar  Mediterranean/[place] dietary lifestyle         Essential (primary) hypertension    Stable on present medication         Hypercholesteremia - Primary    On high-dose atorvastatin and Mediterranean dietary lifestyle           FU with me in 1 year        Pt. care time is spent includes review of diagnostic tests, labs, radiographs, EKGs, old echoes, cardiac work-up and coordination of care. Assessment, impression and plans are reflected in the note above as well as the orders.    Kulwant Cartwright,   [facility] [place]  [facility]

## 2025-07-31 NOTE — ASSESSMENT & PLAN NOTE
Continue chronic aspirin therapy and high-dose statin therapy, commitment to daily walking/exercise and low carbohydrate and low sugar Mediterranean/M-KOPA dietary lifestyle

## 2025-08-01 ASSESSMENT — PROMIS GLOBAL HEALTH SCALE
CARRYOUT_SOCIAL_ACTIVITIES: EXCELLENT
RATE_AVERAGE_PAIN: 0
RATE_PHYSICAL_HEALTH: EXCELLENT
RATE_GENERAL_HEALTH: EXCELLENT
RATE_MENTAL_HEALTH: EXCELLENT
RATE_SOCIAL_SATISFACTION: EXCELLENT
CARRYOUT_PHYSICAL_ACTIVITIES: COMPLETELY
EMOTIONAL_PROBLEMS: NEVER
RATE_QUALITY_OF_LIFE: EXCELLENT

## 2025-08-05 ENCOUNTER — TELEPHONE (OUTPATIENT)
Dept: PRIMARY CARE | Facility: CLINIC | Age: 60
End: 2025-08-05

## 2025-08-05 ENCOUNTER — APPOINTMENT (OUTPATIENT)
Dept: PRIMARY CARE | Facility: CLINIC | Age: 60
End: 2025-08-05
Payer: COMMERCIAL

## 2025-08-05 VITALS
WEIGHT: 186 LBS | DIASTOLIC BLOOD PRESSURE: 88 MMHG | SYSTOLIC BLOOD PRESSURE: 148 MMHG | BODY MASS INDEX: 29.13 KG/M2 | OXYGEN SATURATION: 95 % | HEART RATE: 67 BPM

## 2025-08-05 DIAGNOSIS — I10 ESSENTIAL (PRIMARY) HYPERTENSION: ICD-10-CM

## 2025-08-05 DIAGNOSIS — R73.01 IFG (IMPAIRED FASTING GLUCOSE): ICD-10-CM

## 2025-08-05 DIAGNOSIS — E78.00 PURE HYPERCHOLESTEROLEMIA: ICD-10-CM

## 2025-08-05 DIAGNOSIS — Z00.00 WELL ADULT EXAM: ICD-10-CM

## 2025-08-05 DIAGNOSIS — Z72.0 TOBACCO ABUSE: ICD-10-CM

## 2025-08-05 DIAGNOSIS — I25.10 CORONARY ARTERY DISEASE INVOLVING NATIVE CORONARY ARTERY OF NATIVE HEART WITHOUT ANGINA PECTORIS: Primary | ICD-10-CM

## 2025-08-05 DIAGNOSIS — Z12.5 SCREENING FOR PROSTATE CANCER: ICD-10-CM

## 2025-08-05 PROCEDURE — 99212 OFFICE O/P EST SF 10 MIN: CPT | Performed by: FAMILY MEDICINE

## 2025-08-05 PROCEDURE — 99396 PREV VISIT EST AGE 40-64: CPT | Performed by: FAMILY MEDICINE

## 2025-08-05 PROCEDURE — 3079F DIAST BP 80-89 MM HG: CPT | Performed by: FAMILY MEDICINE

## 2025-08-05 PROCEDURE — 3077F SYST BP >= 140 MM HG: CPT | Performed by: FAMILY MEDICINE

## 2025-08-05 ASSESSMENT — PAIN SCALES - GENERAL: PAINLEVEL_OUTOF10: 0-NO PAIN

## 2025-08-05 NOTE — ASSESSMENT & PLAN NOTE
Continue amlodipine, baby aspirin and following with his cardiologist.  Recheck with me in 6 months.

## 2025-08-05 NOTE — ASSESSMENT & PLAN NOTE
Anticipatory guidance given.  He declines pneumonia, RSV and shingles vaccination at this time.

## 2025-08-05 NOTE — ASSESSMENT & PLAN NOTE
Will put an order for repeat low-dose CT scan in 6 months.    Orders:    CT lung screening low dose; Future

## 2025-08-05 NOTE — ASSESSMENT & PLAN NOTE
Continue atorvastatin.  Continue improved diet by cutting back on fats in diet.  Recheck in 6 months.

## 2025-08-05 NOTE — PROGRESS NOTES
Subjective   Patient ID: Willy Rm is a 60 y.o. male who presents for Annual Exam.    HPI     1. GENE is seen for for his comprehensive physical exam. PMH, PSH, family history and social history were reviewed and updated.   Colonoscopy in 3/21 by Dr. Martinez showed a tubular adenoma.  He is to repeat the colonoscopy in 2026.     2. GENE is seen today also for follow up of High cholesterol.  He is on atorvastatin 40 mg. Recent LDL 68.      3. GENE is seen today for follow-up CAD.  He is followed by Dr. Cartwright.  He had 2 stents placed in his LAD in 8/18. He is on amlodipine and a baby aspirin daily.     4. He is also here for follow-up of hypertension.  He is on amlodipine 5 mg.      5. He is also here for follow-up of aortic aneurysm.  He is status post stent placement by Dr. Valladares in 2023.  He is followed annually.      6.  he is also here for history of tobacco smoking.    He quit smoking in 2018. He had a low-dose CT scan of his chest in 11/22 and 1/25  which were normal.      7. he is also here for umbilical hernia and left inguinal hernia.  He was seen by surgeon regarding the umbilical hernia in 2021 and no surgery at this time was recommended.  He is noted to have a slight left groin hernia today which is asymptomatic.  Review of Systems  Denies headache, blurred vision, chest pain, shortness of breath, nausea or vomiting, change in bowel habits or leg pain or swelling.    Objective   /88 (BP Location: Left arm)   Pulse 67   Wt 84.4 kg (186 lb)   SpO2 95%   BMI 29.13 kg/m²     Physical Exam  General appearance: Vital signs have been reviewed.  Comfortable.  Well-nourished and well-developed.He is alert and oriented x3 and appears his stated age.The patient is cooperative with exam.  Head: Hair pattern reveals a normal pattern for patient's age and face shows no abnormalities.  Eyes: PERRLA x2, EOMI x2, conjunctive a and sclera are clear.  Ears: External bilateral ears with normal helix,  tragus and earlobe.Bilateral canals are normal.Bilateral tympanic membranes are pearly gray and landmarks are well visualized.  Nose: Nasal mucosa both nostrils reveals no polyps, ulcerations or lesions.  Throat:Teeth are in good repair.  Posterior pharynx reveals no abnormalities.  Neck: Supple without lymphadenopathy, thyromegaly or carotid bruits.  Lungs:Clear to auscultation bilaterally with no wheezes, rales or rhonchi.  Cardiovascular: RRR without MRG.No carotid bruits.  Extremities without edema and pulses are intact.  Abdomen: Soft, NT, no masses, no hepatosplenomegaly.  Genitalia: No testicular masses.  No evidence of inguinal hernia.Nontender.  Rectal: No masses.  Prostate is normal in size and shape with no nodules. It is nontender.  Musculoskeletal: 5/5 and equal strength in bilateral upper and lower extremities.  Skin: Good turgor and without rashes.  Neurological: Good overall strength and no focal deficits.  Cranial nerves II through XII are grossly intact.  Psychiatric: Patient has appropriate judgment with good insight.  Mood is appropriate.    Assessment/Plan   Assessment & Plan  Coronary artery disease involving native coronary artery of native heart without angina pectoris  Continue amlodipine, baby aspirin and following with his cardiologist.  Recheck with me in 6 months.         Essential (primary) hypertension  Continue amlodipine and cutting back on salt in diet.  Recheck with me in 6 months.         Pure hypercholesterolemia  Continue atorvastatin.  Continue improved diet by cutting back on fats in diet.  Recheck in 6 months.         Well adult exam  Anticipatory guidance given.  He declines pneumonia, RSV and shingles vaccination at this time.         Tobacco abuse  Will put an order for repeat low-dose CT scan in 6 months.    Orders:    CT lung screening low dose; Future

## 2026-02-06 ENCOUNTER — APPOINTMENT (OUTPATIENT)
Dept: PRIMARY CARE | Facility: CLINIC | Age: 61
End: 2026-02-06
Payer: COMMERCIAL

## 2026-08-07 ENCOUNTER — APPOINTMENT (OUTPATIENT)
Dept: PRIMARY CARE | Facility: CLINIC | Age: 61
End: 2026-08-07
Payer: COMMERCIAL